# Patient Record
Sex: MALE | Race: WHITE
[De-identification: names, ages, dates, MRNs, and addresses within clinical notes are randomized per-mention and may not be internally consistent; named-entity substitution may affect disease eponyms.]

---

## 2017-12-23 ENCOUNTER — HOSPITAL ENCOUNTER (EMERGENCY)
Dept: HOSPITAL 76 - ED | Age: 32
LOS: 1 days | Discharge: HOME | End: 2017-12-24
Payer: COMMERCIAL

## 2017-12-23 VITALS — DIASTOLIC BLOOD PRESSURE: 88 MMHG | SYSTOLIC BLOOD PRESSURE: 183 MMHG
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
## 2017-12-23 DIAGNOSIS — S62.521A: Primary | ICD-10-CM

## 2017-12-23 PROCEDURE — 99282 EMERGENCY DEPT VISIT SF MDM: CPT

## 2017-12-23 PROCEDURE — 73140 X-RAY EXAM OF FINGER(S): CPT

## 2017-12-23 PROCEDURE — 99283 EMERGENCY DEPT VISIT LOW MDM: CPT

## 2017-12-23 PROCEDURE — 29125 APPL SHORT ARM SPLINT STATIC: CPT

## 2017-12-23 NOTE — ED PHYSICIAN DOCUMENTATION
PD HPI UPPER EXT INJURY





- Stated complaint


Stated Complaint: RT THUMB INJURY





- Chief complaint


Chief Complaint: Ext Problem





- History obtained from


History obtained from: Patient, Family





- History of Present Illness


Location: Right, Other (thumb)


Type of injury: Other (states walking the dog and it became caught in the leash)


Where injury occurred: Home


Timing - onset: How many hours ago (5)


Pain level max: 6


Pain level now: 4


Improved by: Rest


Worsened by: Moving, Palpating


Associated symptoms: Swelling.  No: Weakness, Numbness, Tingling


Contributing factors: No: Anticoagulated, Prior ortho surgery


Recently seen: Not recently seen





- Additonal information


Additional information: 





pt is right handed





Review of Systems


Skin: denies: Rash


Neurologic: denies: Focal weakness, Numbness





PD PAST MEDICAL HISTORY





- Past Medical History


Past Medical History: No





- Past Surgical History


Past Surgical History: No





- Present Medications


Home Medications: 


 Ambulatory Orders











 Medication  Instructions  Recorded  Confirmed


 


No Known Home Medications [No  12/23/17 12/23/17





Known Home Medications]   














- Allergies


Allergies/Adverse Reactions: 


 Allergies











Allergy/AdvReac Type Severity Reaction Status Date / Time


 


Penicillins Allergy  Rash Verified 12/23/17 23:26














- Social History


Does the pt smoke?: No


Smoking Status: Never smoker


Does the pt drink ETOH?: No


Does the pt have substance abuse?: No





- Immunizations


Immunizations are current?: Yes





PD ED PE NORMAL





- Vitals


Vital signs reviewed: Yes





- General


General: Alert and oriented X 3, No acute distress





- HEENT


HEENT: Moist mucous membranes





- Extremities


Extremities: Other (R thumb - TTP over the IP joint. mild swelling. no 

laceration. FROM present. no tendon injury. NVI. no nail injury)





- Neuro


Neuro: Alert and oriented X 3





Results





- Vitals


Vitals: 


 Vital Signs - 24 hr











  12/23/17





  23:26


 


Temperature 36.5 C


 


Heart Rate 83


 


Respiratory 16





Rate 


 


Blood Pressure 183/88 H


 


O2 Saturation 99








 Oxygen











O2 Source                      Room air

















- Rads (name of study)


  ** R thumb xray


Radiology: Prelim report reviewed, EMP read contemporaneously, See rad report (

Comminuted intra-articular first distal phalangeal fracture. )





Procedures





- Splint (location)


  ** R thumb spica


Splint applied by: Physician, Tech


Type of splint: Fiberglass, Thumb spica


Other: Patient tolerated well, No complications, Neurovascular intact





PD MEDICAL DECISION MAKING





- ED course


Complexity details: reviewed results, re-evaluated patient, considered 

differential, d/w patient, d/w family


ED course: 





Patient is a 32-year-old male who presents to the emergency department with a 

right thumb injury.  Has a comminuted intra-articular fracture of the distal 

phalanx of the right thumb.  Placed in a thumb spica.  Will follow up with 

orthopedics for further evaluation and care.  Neurovascularly intact.  Patient 

counseled regarding signs and symptoms for which I believe and urgent re-

evaluation would be necessary. Patient with good understanding of and agreement 

to plan and is comfortable going home at this time





This document was made in part using voice recognition software. While efforts 

are made to proofread this document, sound alike and grammatical errors may 

occur.





Departure





- Departure


Disposition: 01 Home, Self Care


Clinical Impression: 


Thumb fracture


Qualifiers:


 Encounter type: initial encounter Fracture type: closed Phalanx: distal 

Fracture alignment: nondisplaced Laterality: right Qualified Code(s): S62.524A 

- Nondisplaced fracture of distal phalanx of right thumb, initial encounter for 

closed fracture





Condition: Good


Instructions:  ED Fx Thumb


Follow-Up: 


Randy Downing MD [Primary Care Provider] - 


[facility] [Provider Group] - Within 1 week


Comments: 


Return if you worsen. Keep the splint on until released by orthopedics. You can 

use motrin or tylenol for pain.


Forms:  Activity restrictions


Discharge Date/Time: 12/24/17 00:24

## 2017-12-23 NOTE — XRAY PRELIMINARY REPORT
Accession: [id number]

Exam: XR FINGER(S) RT

 

IMPRESSION: Comminuted intra-articular first distal phalangeal fracture.

 

[facility] 

 

SITE ID: [id number]

## 2017-12-24 NOTE — XRAY REPORT
EXAM:

RIGHT FIRST DIGIT RADIOGRAPHY 

 

EXAM DATE: 12/23/2017 11:50 PM.

 

CLINICAL HISTORY: Right thumb injury on dog leash.

 

COMPARISON: None.

 

TECHNIQUE: 3 views.

 

FINDINGS: 

Bones: Comminuted fracture of the first distal phalanx, with minimal displacement of the fracture fra
gments and fracture lines extending into the interphalangeal joint.

 

Joints: Normal. No subluxations.

 

Soft Tissues: Swelling overlying the fracture site.

 

IMPRESSION: Comminuted intra-articular first distal phalangeal fracture.

 

[facility] 

Referring Provider Line: [phone]

 

SITE ID: [id number]

## 2018-07-16 ENCOUNTER — HOSPITAL ENCOUNTER (OUTPATIENT)
Dept: HOSPITAL 76 - DI | Age: 33
Discharge: HOME | End: 2018-07-16
Attending: INTERNAL MEDICINE
Payer: COMMERCIAL

## 2018-07-16 DIAGNOSIS — M25.872: ICD-10-CM

## 2018-07-16 DIAGNOSIS — R20.0: ICD-10-CM

## 2018-07-16 DIAGNOSIS — M79.672: Primary | ICD-10-CM

## 2018-07-16 PROCEDURE — 73720 MRI LWR EXTREMITY W/O&W/DYE: CPT

## 2018-07-17 NOTE — MRI REPORT
Procedure Date:  07/16/2018   

Accession Number:  [id number] / [id number]                    

Procedure:  MRI - Foot LT W/WO CPT Code:  

 

FULL RESULT:

 

 

EXAM:

LEFT FOREFOOT MRI WITHOUT AND WITH CONTRAST

 

EXAM DATE: 7/16/2018 12:20 PM.

 

CLINICAL HISTORY: Soft tissue mass lateral and dorsal aspect.

 

COMPARISON: None.

 

TECHNIQUE: Multiplanar, multisequence T1-weighted and fluid-sensitive 

sequences of the forefoot before and after administration of intravenous 

contrast. IV contrast: 11.5 mL of Gadavist. Other: None.

 

FINDINGS:

Bones: There is hallux valgus. The marrow appears unremarkable. There are 

no appreciable bony erosions or foci of marrow edema.

 

Joints: No subluxations. No effusions. The upgvoc-[id number]-[id number] 

complex is unremarkable. The visualized plantar plates are unremarkable.

 

Articular Cartilage: Unremarkable.

 

Ligaments: The visualized collateral ligaments are intact.

 

Tendons: The flexor and extensor tendons are unremarkable.

 

Musculature: No edema or fatty atrophy.

 

Other: There is a 0.7 x 2.8 x 1.5 cm fluid containing structure overlying 

the plantar aspect of the fifth metatarsal head. The findings are 

suggestive of focal bursitis.

IMPRESSION:

1. Fluid containing structure over the plantar aspect of the fifth 

metatarsal head suggestive of a focal bursitis.

2. No evidence of arthritis.

 

[facility] MUSCULOSKELETAL RADIOLOGY SECTION

## 2020-02-16 ENCOUNTER — HOSPITAL ENCOUNTER (OUTPATIENT)
Dept: HOSPITAL 76 - DI | Age: 35
Discharge: HOME | End: 2020-02-16
Attending: NURSE PRACTITIONER
Payer: COMMERCIAL

## 2020-02-16 DIAGNOSIS — R74.8: Primary | ICD-10-CM

## 2020-02-16 PROCEDURE — 76705 ECHO EXAM OF ABDOMEN: CPT

## 2020-02-16 NOTE — ULTRASOUND REPORT
Reason:  LIVER ENZYMES ABNL

Procedure Date:  02/16/2020   

Accession Number:  [id number] / [id number]                    

Procedure:  US  - Abdomen Limited CPT Code:  

 

***Final Report***

 

 

FULL RESULT:

 

 

EXAM:

ABDOMEN ULTRASOUND LIMITED, RUQ

 

EXAM DATE: 2/16/2020 09:38 AM.

 

CLINICAL HISTORY: Liver enzymes abnormal.

 

COMPARISON: None.

 

TECHNIQUE: Real-time scanning was performed with static images obtained.

 

FINDINGS:

Liver: Slightly coarse heterogeneous liver without focal nodules or 

masses. Liver is 16.2 cm. Main portal vein flow: Hepatopetal.

 

Gallbladder: Normal. No stones, wall thickening, or sonographic Suero's 

sign.

 

Biliary System: CBD measures 4.0 mm. No intrahepatic or extrahepatic 

ductal dilatation.

 

Other: Right kidney normal at 11.2 cm. Pancreas not well seen because of 

gas.

IMPRESSION:

1. Slightly coarse heterogeneous liver without focal nodules or masses. 

Liver is 16.2 cm. Gallbladder normal. Common bile duct also unremarkable.

 

[facility]